# Patient Record
Sex: FEMALE | Race: WHITE | NOT HISPANIC OR LATINO | Employment: OTHER | URBAN - METROPOLITAN AREA
[De-identification: names, ages, dates, MRNs, and addresses within clinical notes are randomized per-mention and may not be internally consistent; named-entity substitution may affect disease eponyms.]

---

## 2018-01-23 DIAGNOSIS — Z13.820 ENCOUNTER FOR SCREENING FOR OSTEOPOROSIS: ICD-10-CM

## 2018-01-23 DIAGNOSIS — Z01.419 ENCOUNTER FOR GYNECOLOGICAL EXAMINATION WITHOUT ABNORMAL FINDING: ICD-10-CM

## 2018-01-26 LAB
CLINICAL INFO: NORMAL
CYTO CVX: NORMAL
DATE PREVIOUS BX: NORMAL
HPV E6+E7 MRNA CVX QL NAA+PROBE: NOT DETECTED
LMP START DATE: NORMAL
QUESTION/PROBLEM: NORMAL
SL AMB CONTAINER TYPE: NORMAL
SL AMB FINAL RESOLUTION: NORMAL
SL AMB PREV. PAP:: NORMAL
SL AMB REPORT STATUS: NORMAL
SPECIMEN SOURCE CVX/VAG CYTO: NORMAL

## 2018-02-06 ENCOUNTER — TELEPHONE (OUTPATIENT)
Dept: OBGYN CLINIC | Facility: CLINIC | Age: 63
End: 2018-02-06

## 2018-02-06 DIAGNOSIS — N94.10 DYSPAREUNIA IN FEMALE: Primary | ICD-10-CM

## 2018-02-06 DIAGNOSIS — N95.2 ATROPHY OF VAGINA: ICD-10-CM

## 2018-02-06 DIAGNOSIS — B00.9 HSV INFECTION: ICD-10-CM

## 2018-02-06 PROBLEM — R92.2 DENSE BREAST TISSUE: Status: ACTIVE | Noted: 2018-01-23

## 2018-02-06 PROBLEM — R92.30 DENSE BREAST TISSUE: Status: ACTIVE | Noted: 2018-01-23

## 2018-02-06 RX ORDER — VALACYCLOVIR HYDROCHLORIDE 500 MG/1
500 TABLET, FILM COATED ORAL 2 TIMES DAILY
Qty: 30 TABLET | Refills: 3 | Status: SHIPPED | OUTPATIENT
Start: 2018-02-06 | End: 2019-03-12

## 2018-02-06 RX ORDER — MELATONIN
COMMUNITY
Start: 2018-01-23

## 2018-02-06 NOTE — TELEPHONE ENCOUNTER
Patient calling for a prescription for Jasonnahed Hoskins states she had sample when she was in the office  Pt would also like a prescription for Valtrex  She would like quantity of 30 with refills  She forgot to discuss at appointment in January  Pt states she only uses when she has an outbreak but like to have some on hand  Patient currently living in [address] [place] [address]  Pharmacy updated  Routing to provider for prescriptions

## 2018-07-26 DIAGNOSIS — Z13.820 OSTEOPOROSIS SCREENING: ICD-10-CM

## 2018-07-26 DIAGNOSIS — Z12.31 ENCOUNTER FOR SCREENING MAMMOGRAM FOR MALIGNANT NEOPLASM OF BREAST: Primary | ICD-10-CM

## 2018-07-26 NOTE — PROGRESS NOTES
Pt called requesting us to send her mammo and DXA scan orders to her home  Verified address  Was here for yearly with Dr Barak Escalante on 1/23/18 [facility] pt states she lost her orders that were given then  New orders now placed in epic

## 2018-09-04 ENCOUNTER — TELEPHONE (OUTPATIENT)
Dept: OBGYN CLINIC | Facility: CLINIC | Age: 63
End: 2018-09-04

## 2018-09-04 NOTE — TELEPHONE ENCOUNTER
Reviewed dexa scan results with patient  Advised low bone density - continue calcium 1500mg Qday, Vit D 800-100 IU daily, weight bearing exercise 2-3 times a week and repeat dexa scan in 2 years  Mammogram results - Birads2 - advised to repeat in 1 year  Pt verbalized understanding

## 2018-09-04 NOTE — TELEPHONE ENCOUNTER
----- Message from Anjum Tovar MD sent at 8/31/2018  7:02 PM EDT -----  Please call    Dexa - low bone density - at this point continue Calcium 1500mg qday, Vit D 800-1000IU perday, weight bearing exercise and repeat Dexa in 2 years    Mammo - BiRADs2 - repeat 1 year

## 2018-10-09 ENCOUNTER — OFFICE VISIT (OUTPATIENT)
Dept: OBGYN CLINIC | Facility: CLINIC | Age: 63
End: 2018-10-09
Payer: COMMERCIAL

## 2018-10-09 VITALS — WEIGHT: 106.6 LBS | DIASTOLIC BLOOD PRESSURE: 64 MMHG | SYSTOLIC BLOOD PRESSURE: 122 MMHG

## 2018-10-09 DIAGNOSIS — N95.2 POSTMENOPAUSAL ATROPHIC VAGINITIS: Primary | ICD-10-CM

## 2018-10-09 PROCEDURE — 99213 OFFICE O/P EST LOW 20 MIN: CPT | Performed by: OBSTETRICS & GYNECOLOGY

## 2018-10-09 NOTE — PROGRESS NOTES
Assessment Heri Dietz was seen today for other  Diagnoses and all orders for this visit:    Postmenopausal atrophic vaginitis  -     Discontinue: conjugated estrogens (PREMARIN) vaginal cream; Insert 0 5 g into the vagina daily  -     conjugated estrogens (PREMARIN) vaginal cream; Insert 0 5 g into the vagina daily         Plan    Start premarin twice weekly  Coconut oil BID prn  Vaginal dilators in 4-6 weeks  Return to office for annual exam      Kathy Bah is a 58 y o  female here for a problem visit  Patient is complaining of vaginal dryness  She tried Replens which was unsuccessful  She took [place] for a month, but then stopped because she was concerned about an oral medication  She is interested in Premarin cream  Safe and effective use discussed with the patient after refusing the risks/benefits  We also discussed the use of coconut oil and vaginal dilators  Patient Active Problem List   Diagnosis    Atrophy of vagina    Dense breast tissue    Dyspareunia in female         Gynecologic History  No LMP recorded (exact date)  Patient is postmenopausal   Last Pap: 2018  Results were: normal; HPV negative    Menstrual History:  OB History      Para Term  AB Living    0 0 0 0 0 0    SAB TAB Ectopic Multiple Live Births    0 0 0 0 0           No LMP recorded (exact date)  Patient is postmenopausal          Obstetric History  OB History    Para Term  AB Living   0 0 0 0 0 0   SAB TAB Ectopic Multiple Live Births   0 0 0 0 0               History reviewed  No pertinent past medical history      Past Surgical History:   Procedure Laterality Date    KNEE SURGERY  2018    SHOULDER SURGERY      TONSILLECTOMY           Family History   Problem Relation Age of Onset   Kanchan Lynch Breast cancer Mother 76       Social History     Social History    Marital status: Single     Spouse name: N/A    Number of children: N/A    Years of education: N/A     Occupational History    Not on file  Social History Main Topics    Smoking status: Never Smoker    Smokeless tobacco: Never Used    Alcohol use 8 4 oz/week     14 Standard drinks or equivalent per week    Drug use: No    Sexual activity: Not Currently     Partners: Male     Birth control/ protection: None     Other Topics Concern    Not on file     Social History Narrative    No narrative on file       Allergies  Sulfa antibiotics    Medications    Current Outpatient Prescriptions:     Calcium Carbonate (CALCIUM 600) 1500 (600 Ca) MG TABS, Take by mouth, Disp: , Rfl:     cholecalciferol (VITAMIN D3) 1,000 units tablet, Take by mouth, Disp: , Rfl:     conjugated estrogens (PREMARIN) vaginal cream, Insert 0 5 g into the vagina daily, Disp: 42 5 g, Rfl: 6    Multiple Vitamins-Minerals (CENTRUM SILVER PO), Take by mouth, Disp: , Rfl:     valACYclovir (VALTREX) 500 mg tablet, Take 1 tablet (500 mg total) by mouth 2 (two) times a day for 30 days, Disp: 30 tablet, Rfl: 3      Review of Systems  Review of Systems   Genitourinary: Positive for vaginal pain  Negative for vaginal bleeding and vaginal discharge  Objective     /64   Wt 48 4 kg (106 lb 9 6 oz)   LMP  (Exact Date)   Breastfeeding? No       Physical Exam   Constitutional: She is oriented to person, place, and time  She appears well-developed and well-nourished  Neurological: She is alert and oriented to person, place, and time  Psychiatric: She has a normal mood and affect   Her behavior is normal

## 2019-03-12 ENCOUNTER — TELEPHONE (OUTPATIENT)
Dept: OBGYN CLINIC | Facility: CLINIC | Age: 64
End: 2019-03-12

## 2019-03-12 DIAGNOSIS — B00.9 HSV INFECTION: ICD-10-CM

## 2019-03-12 RX ORDER — VALACYCLOVIR HYDROCHLORIDE 500 MG/1
500 TABLET, FILM COATED ORAL DAILY
Qty: 90 TABLET | Refills: 3 | Status: SHIPPED | OUTPATIENT
Start: 2019-03-12 | End: 2020-05-18 | Stop reason: SDUPTHER

## 2019-03-12 NOTE — TELEPHONE ENCOUNTER
Patient call for refill on valacyclovir        Last annual 1/23/18    Next annual 4/11/19    Pharmacy updated  Routing to provider

## 2019-04-11 ENCOUNTER — ANNUAL EXAM (OUTPATIENT)
Dept: OBGYN CLINIC | Facility: CLINIC | Age: 64
End: 2019-04-11
Payer: MEDICARE

## 2019-04-11 VITALS
DIASTOLIC BLOOD PRESSURE: 60 MMHG | SYSTOLIC BLOOD PRESSURE: 102 MMHG | BODY MASS INDEX: 20.39 KG/M2 | HEIGHT: 61 IN | WEIGHT: 108 LBS

## 2019-04-11 DIAGNOSIS — Z01.419 WELL FEMALE EXAM WITH ROUTINE GYNECOLOGICAL EXAM: Primary | ICD-10-CM

## 2019-04-11 DIAGNOSIS — N94.10 DYSPAREUNIA IN FEMALE: ICD-10-CM

## 2019-04-11 DIAGNOSIS — Z12.31 ENCOUNTER FOR SCREENING MAMMOGRAM FOR MALIGNANT NEOPLASM OF BREAST: ICD-10-CM

## 2019-04-11 PROCEDURE — G0101 CA SCREEN;PELVIC/BREAST EXAM: HCPCS | Performed by: OBSTETRICS & GYNECOLOGY

## 2019-08-26 DIAGNOSIS — N95.2 POSTMENOPAUSAL ATROPHIC VAGINITIS: ICD-10-CM

## 2019-08-26 NOTE — TELEPHONE ENCOUNTER
Patient calling for refill on premarin cream     Last annual 4/2019    Pharmacy updated  Routing to provider to order

## 2019-08-29 DIAGNOSIS — N95.2 POSTMENOPAUSAL ATROPHIC VAGINITIS: ICD-10-CM

## 2019-08-29 NOTE — TELEPHONE ENCOUNTER
Pt called stating she needs to use [facility] her mail order  [facility] faxed over form for finesse Yates set the form on Dr Mayo Partida desk to sign  Pt aware I am waiting for the form to be filled out and I will then fax it back to the pharmacy

## 2019-08-29 NOTE — TELEPHONE ENCOUNTER
Dr Frankiln Kraus, this Pt needs her premarin cream sent to her mail order pharmacy as a three month supply  Can you please send that over for her? It's the [facility] mail order listed in her chart  Thank you!

## 2019-08-30 NOTE — TELEPHONE ENCOUNTER
Pt is calling her insurance to verify what mail order she has to use  Pt will call back this afternoon with the pharmacy  If she needs to use [facility] she will have the form refaxed, the form was accidentally shredded this morning

## 2019-09-04 ENCOUNTER — DOCUMENTATION (OUTPATIENT)
Dept: OBGYN CLINIC | Facility: CLINIC | Age: 64
End: 2019-09-04

## 2019-09-04 NOTE — PROGRESS NOTES
Spoke with Pt and her mail order is [facility]  Form was completed by Dr Axel Chong and faxed over for Pt on 9/3/19  Pt called this morning and stated that the pharmacy needed the dispense quantity to be updated to tube instead of in grams  Spoke with Alan Jensen, the pharmacist at [facility] and updated per Dr David Perdomo will send out Pt's script today    BS

## 2020-05-11 DIAGNOSIS — Z12.31 ENCOUNTER FOR SCREENING MAMMOGRAM FOR MALIGNANT NEOPLASM OF BREAST: Primary | ICD-10-CM

## 2020-05-11 DIAGNOSIS — B00.9 HSV INFECTION: ICD-10-CM

## 2020-05-18 RX ORDER — VALACYCLOVIR HYDROCHLORIDE 500 MG/1
500 TABLET, FILM COATED ORAL DAILY
Qty: 90 TABLET | Refills: 3 | Status: SHIPPED | OUTPATIENT
Start: 2020-05-18 | End: 2020-06-12

## 2020-06-12 ENCOUNTER — TELEMEDICINE (OUTPATIENT)
Dept: OBGYN CLINIC | Facility: CLINIC | Age: 65
End: 2020-06-12
Payer: MEDICARE

## 2020-06-12 VITALS — WEIGHT: 110 LBS | BODY MASS INDEX: 20.78 KG/M2

## 2020-06-12 DIAGNOSIS — B00.9 HSV INFECTION: ICD-10-CM

## 2020-06-12 DIAGNOSIS — N95.2 POSTMENOPAUSAL ATROPHIC VAGINITIS: ICD-10-CM

## 2020-06-12 DIAGNOSIS — Z13.820 OSTEOPOROSIS SCREENING: Primary | ICD-10-CM

## 2020-06-12 PROBLEM — G45.4 TRANSIENT GLOBAL AMNESIA: Status: ACTIVE | Noted: 2020-02-04

## 2020-06-12 PROCEDURE — 99213 OFFICE O/P EST LOW 20 MIN: CPT | Performed by: OBSTETRICS & GYNECOLOGY

## 2020-06-12 RX ORDER — DOXYCYCLINE HYCLATE 100 MG
TABLET ORAL
COMMUNITY
End: 2021-10-07

## 2020-06-12 RX ORDER — VALACYCLOVIR HYDROCHLORIDE 500 MG/1
500 TABLET, FILM COATED ORAL DAILY
Qty: 90 TABLET | Refills: 3 | Status: SHIPPED | OUTPATIENT
Start: 2020-06-12 | End: 2021-10-13 | Stop reason: SDUPTHER

## 2020-08-13 ENCOUNTER — TELEPHONE (OUTPATIENT)
Dept: OBGYN CLINIC | Facility: CLINIC | Age: 65
End: 2020-08-13

## 2020-08-13 DIAGNOSIS — N95.2 POSTMENOPAUSAL ATROPHIC VAGINITIS: ICD-10-CM

## 2020-08-13 NOTE — TELEPHONE ENCOUNTER
Advised prescription has been sent to [facility] please call if it is expensive and we will send compounding to [address] [facility], [address] Verbalized understanding

## 2020-08-13 NOTE — TELEPHONE ENCOUNTER
Patient state premarin cream is costing approximately $180 she would like to know if we can give her something different  Advised looks like last year it had to go to [facility]  She said we can try that if that is still expensive - [facility] [address] [facility]      Routing to provider to send to [facility]

## 2020-08-21 NOTE — TELEPHONE ENCOUNTER
Spoke with Chuckie at [facility]    E3 1mg/g   Disp 30g  Sig: Insert 0 5g into the vagina daily  Refills 6    [facility] will call patient  Routing to provider to update

## 2020-08-21 NOTE — TELEPHONE ENCOUNTER
Patient states Fish looks like they put over $200 hold on her credit card, states that is way too much for premarin  Wants to know if we can send compound to [facility]  Routing to provider for advice

## 2021-10-07 ENCOUNTER — ANNUAL EXAM (OUTPATIENT)
Dept: OBGYN CLINIC | Facility: CLINIC | Age: 66
End: 2021-10-07
Payer: MEDICARE

## 2021-10-07 VITALS
DIASTOLIC BLOOD PRESSURE: 62 MMHG | HEIGHT: 61 IN | BODY MASS INDEX: 20.39 KG/M2 | SYSTOLIC BLOOD PRESSURE: 110 MMHG | WEIGHT: 108 LBS

## 2021-10-07 DIAGNOSIS — Z13.820 OSTEOPOROSIS SCREENING: ICD-10-CM

## 2021-10-07 DIAGNOSIS — Z12.4 SCREENING FOR MALIGNANT NEOPLASM OF CERVIX: ICD-10-CM

## 2021-10-07 DIAGNOSIS — N95.2 POSTMENOPAUSAL ATROPHIC VAGINITIS: ICD-10-CM

## 2021-10-07 DIAGNOSIS — Z12.31 ENCOUNTER FOR SCREENING MAMMOGRAM FOR MALIGNANT NEOPLASM OF BREAST: ICD-10-CM

## 2021-10-07 DIAGNOSIS — M81.0 AGE-RELATED OSTEOPOROSIS WITHOUT CURRENT PATHOLOGICAL FRACTURE: ICD-10-CM

## 2021-10-07 DIAGNOSIS — Z11.51 SCREENING FOR HPV (HUMAN PAPILLOMAVIRUS): ICD-10-CM

## 2021-10-07 DIAGNOSIS — Z11.3 ROUTINE SCREENING FOR STI (SEXUALLY TRANSMITTED INFECTION): ICD-10-CM

## 2021-10-07 DIAGNOSIS — Z01.419 WELL FEMALE EXAM WITH ROUTINE GYNECOLOGICAL EXAM: Primary | ICD-10-CM

## 2021-10-07 PROBLEM — F43.20 ADJUSTMENT DISORDER: Status: ACTIVE | Noted: 2020-10-08

## 2021-10-07 PROCEDURE — G0101 CA SCREEN;PELVIC/BREAST EXAM: HCPCS | Performed by: OBSTETRICS & GYNECOLOGY

## 2021-10-07 PROCEDURE — G0476 HPV COMBO ASSAY CA SCREEN: HCPCS | Performed by: OBSTETRICS & GYNECOLOGY

## 2021-10-07 PROCEDURE — G0145 SCR C/V CYTO,THINLAYER,RESCR: HCPCS | Performed by: OBSTETRICS & GYNECOLOGY

## 2021-10-07 RX ORDER — ESTRADIOL 10 UG/1
1 INSERT VAGINAL 2 TIMES WEEKLY
Qty: 24 TABLET | Refills: 3 | Status: SHIPPED | OUTPATIENT
Start: 2021-10-07

## 2021-10-12 LAB
HPV HR 12 DNA CVX QL NAA+PROBE: NEGATIVE
HPV16 DNA CVX QL NAA+PROBE: NEGATIVE
HPV18 DNA CVX QL NAA+PROBE: NEGATIVE

## 2021-10-13 ENCOUNTER — TELEPHONE (OUTPATIENT)
Dept: OBGYN CLINIC | Facility: CLINIC | Age: 66
End: 2021-10-13

## 2021-10-13 DIAGNOSIS — B00.9 HSV INFECTION: ICD-10-CM

## 2021-10-13 RX ORDER — VALACYCLOVIR HYDROCHLORIDE 500 MG/1
500 TABLET, FILM COATED ORAL DAILY
Qty: 90 TABLET | Refills: 3 | Status: SHIPPED | OUTPATIENT
Start: 2021-10-13 | End: 2021-11-12

## 2021-10-15 LAB
LAB AP GYN PRIMARY INTERPRETATION: NORMAL
Lab: NORMAL

## 2021-10-16 PROBLEM — Z78.0 POSTMENOPAUSAL: Status: ACTIVE | Noted: 2021-10-16

## 2023-01-18 DIAGNOSIS — B00.9 HSV INFECTION: ICD-10-CM

## 2023-01-18 RX ORDER — VALACYCLOVIR HYDROCHLORIDE 500 MG/1
TABLET, FILM COATED ORAL
Qty: 90 TABLET | Refills: 3 | Status: SHIPPED | OUTPATIENT
Start: 2023-01-18 | End: 2023-04-18

## 2024-09-27 ENCOUNTER — TELEPHONE (OUTPATIENT)
Age: 69
End: 2024-09-27

## 2024-09-27 NOTE — TELEPHONE ENCOUNTER
Mei from [facility] called stating their office sent over a Request to release medical records, which I do see in pt's chart as of today. She is requesting MR's be faxed to below number. Thank you.    Fax # [phone]